# Patient Record
Sex: MALE | Race: ASIAN | NOT HISPANIC OR LATINO | Employment: UNEMPLOYED | URBAN - METROPOLITAN AREA
[De-identification: names, ages, dates, MRNs, and addresses within clinical notes are randomized per-mention and may not be internally consistent; named-entity substitution may affect disease eponyms.]

---

## 2018-05-08 ENCOUNTER — SURGERY - HEALTHEAST (OUTPATIENT)
Dept: GASTROENTEROLOGY | Facility: HOSPITAL | Age: 33
End: 2018-05-08

## 2018-05-09 ASSESSMENT — MIFFLIN-ST. JEOR: SCORE: 1537.74

## 2021-06-01 VITALS — BODY MASS INDEX: 26.03 KG/M2 | WEIGHT: 152.5 LBS | HEIGHT: 64 IN

## 2021-06-16 PROBLEM — A04.8 H. PYLORI INFECTION: Status: ACTIVE | Noted: 2018-05-10

## 2021-06-16 PROBLEM — D62 ACUTE BLOOD LOSS ANEMIA: Status: ACTIVE | Noted: 2018-05-09

## 2021-06-16 PROBLEM — K26.9 DUODENAL ULCER: Status: ACTIVE | Noted: 2018-05-08

## 2021-06-16 PROBLEM — K92.2 GI BLEED: Status: ACTIVE | Noted: 2018-05-07

## 2024-01-10 ENCOUNTER — TELEPHONE (OUTPATIENT)
Dept: FAMILY MEDICINE | Facility: CLINIC | Age: 39
End: 2024-01-10

## 2024-01-10 NOTE — TELEPHONE ENCOUNTER
Pt not having trouble breathing or other concerning sx.  Pt is not established pt at [facility]. He states he previously went to [facility].   unable to schedule virtual appointment due to not being established. Pt advised to contact [facility] or [place] dept of health telehealth covid 19 treatment through internet. It will guide him through how to get treatment.    Pt verbalized understanding.  Deepika HERNANDEZN, RN

## 2024-02-12 ENCOUNTER — VIRTUAL VISIT (OUTPATIENT)
Dept: FAMILY MEDICINE | Facility: CLINIC | Age: 39
End: 2024-02-12
Payer: COMMERCIAL

## 2024-02-12 DIAGNOSIS — Z86.16 HISTORY OF COVID-19: ICD-10-CM

## 2024-02-12 DIAGNOSIS — R53.83 FATIGUE, UNSPECIFIED TYPE: Primary | ICD-10-CM

## 2024-02-12 PROBLEM — G43.109 MIGRAINE HEADACHE WITH AURA: Status: ACTIVE | Noted: 2022-01-11

## 2024-02-12 PROCEDURE — 99203 OFFICE O/P NEW LOW 30 MIN: CPT | Mod: 95 | Performed by: NURSE PRACTITIONER

## 2024-02-12 RX ORDER — TRAZODONE HYDROCHLORIDE 50 MG/1
50 TABLET, FILM COATED ORAL AT BEDTIME
COMMUNITY
Start: 2024-02-09

## 2024-02-12 NOTE — PROGRESS NOTES
"Nhung is a 38 year old who is being evaluated via a billable video visit.      How would you like to obtain your AVS? MyChart  If the video visit is dropped, the invitation should be resent by: Text to cell phone: [phone]  Will anyone else be joining your video visit? No          Assessment & Plan     Fatigue, unspecified type  *    History of COVID-19  *  -I reviewed prior note from other provider on [facility] Everywhere.  The other provider was very thorough and ordering lab tests, an EKG. I told the patient to try to get over to that clinic to get labs completed so we can further evaluate if anything abnormality is noted. I discussed if symptoms worse, he can get seen in Urgent Care.   -sleep hygiene encouraged.     Subjective   Nhung is a 38 year old, presenting for the following health issues:  Fatigue         No data to display              HPI     -Patient present for follow-up visit from [facility].   - had a VV on 2/9/2024 with [facility]. Had COVID-19 on January 1st 2024 and he is still having on-going fatigue. \"Something is going on\" and wonders if \"he has something else that is causing his symptoms like Cancer\". He has been feeling very weak, feels like he is going to collapse when going down the stairs, shakiness, and rapid heart rate.  He denied any chest pain or shortness of breath .  He denied any swelling in his legs .  He denied any bloody stools . He was seen in Urgent Care on 1/17/24 for Sinusitis and was placed on Augmentin and tessalon. That appears to gotten better with the antibiotics. Now just bothered by the insomnia, Has some episodes where he can wake up with palpitations racing  And trembling especially in neck. Has a dry throat.  He stated that he is eating \"everything\", at least 2 or 3 times per day.  He has been noticing insomnia and feels that the trazodone made it worse.  He does sleep throughout the day, but tries to maintain a regular schedule.  Some days are better than " others.   -He wonders if he has cancer.  He denied feeling suicidal or homicidal.         Objective       Vitals:  No vitals were obtained today due to virtual visit.    Physical Exam   GENERAL: alert and no distress-lying on side, in dark room.   EYES: Eyes grossly normal to inspection.  No discharge or erythema, or obvious scleral/conjunctival abnormalities.  RESP: No audible wheeze, cough, or visible cyanosis.    SKIN: Visible skin clear. No significant rash, abnormal pigmentation or lesions.  NEURO: Cranial nerves grossly intact.  Mentation and speech appropriate for age.  PSYCH: Appropriate affect, tone, and pace of words          Video-Visit Details    Type of service:  Video Visit       Originating Location (pt. Location): Home    Distant Location (provider location):  On-site  Platform used for Video Visit: Richelle  Signed Electronically by: WILL Michel CNP

## 2024-02-25 ENCOUNTER — HEALTH MAINTENANCE LETTER (OUTPATIENT)
Age: 39
End: 2024-02-25

## 2025-03-09 ENCOUNTER — HEALTH MAINTENANCE LETTER (OUTPATIENT)
Age: 40
End: 2025-03-09